# Patient Record
Sex: FEMALE | Race: BLACK OR AFRICAN AMERICAN | Employment: UNEMPLOYED | URBAN - METROPOLITAN AREA
[De-identification: names, ages, dates, MRNs, and addresses within clinical notes are randomized per-mention and may not be internally consistent; named-entity substitution may affect disease eponyms.]

---

## 2021-04-18 ENCOUNTER — HOSPITAL ENCOUNTER (EMERGENCY)
Age: 51
Discharge: HOME | End: 2021-04-19
Attending: EMERGENCY MEDICINE
Payer: MEDICAID

## 2021-04-18 ENCOUNTER — APPOINTMENT (OUTPATIENT)
Dept: GENERAL RADIOLOGY | Age: 51
End: 2021-04-18
Attending: EMERGENCY MEDICINE
Payer: MEDICAID

## 2021-04-18 ENCOUNTER — APPOINTMENT (OUTPATIENT)
Dept: CT IMAGING | Age: 51
End: 2021-04-18
Attending: EMERGENCY MEDICINE
Payer: MEDICAID

## 2021-04-18 DIAGNOSIS — D64.9 ANEMIA, UNSPECIFIED TYPE: Primary | ICD-10-CM

## 2021-04-18 DIAGNOSIS — R10.9 ABDOMINAL PAIN, UNSPECIFIED ABDOMINAL LOCATION: ICD-10-CM
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
## 2021-04-18 DIAGNOSIS — D21.9 FIBROID: ICD-10-CM

## 2021-04-18 DIAGNOSIS — S40.019A CONTUSION OF SHOULDER, UNSPECIFIED LATERALITY, INITIAL ENCOUNTER: ICD-10-CM

## 2021-04-18 DIAGNOSIS — S37.019A: ICD-10-CM

## 2021-04-18 LAB
ALBUMIN SERPL-MCNC: 3.4 G/DL (ref 3.4–5)
ALBUMIN/GLOB SERPL: 0.8 {RATIO} (ref 0.8–1.7)
ALP SERPL-CCNC: 139 U/L (ref 45–117)
ALT SERPL-CCNC: 25 U/L (ref 13–56)
ANION GAP SERPL CALC-SCNC: 7 MMOL/L (ref 3–18)
APPEARANCE UR: CLEAR
AST SERPL-CCNC: 26 U/L (ref 10–38)
BASOPHILS # BLD: 0.1 K/UL (ref 0–0.1)
BASOPHILS NFR BLD: 1 % (ref 0–2)
BILIRUB DIRECT SERPL-MCNC: 0.1 MG/DL (ref 0–0.2)
BILIRUB SERPL-MCNC: 0.2 MG/DL (ref 0.2–1)
BILIRUB UR QL: NEGATIVE
BUN SERPL-MCNC: 14 MG/DL (ref 7–18)
BUN/CREAT SERPL: 17 (ref 12–20)
CALCIUM SERPL-MCNC: 8.5 MG/DL (ref 8.5–10.1)
CHLORIDE SERPL-SCNC: 110 MMOL/L (ref 100–111)
CO2 SERPL-SCNC: 25 MMOL/L (ref 21–32)
COLOR UR: YELLOW
CREAT SERPL-MCNC: 0.83 MG/DL (ref 0.6–1.3)
DIFFERENTIAL METHOD BLD: ABNORMAL
EOSINOPHIL # BLD: 0.5 K/UL (ref 0–0.4)
EOSINOPHIL NFR BLD: 8 % (ref 0–5)
ERYTHROCYTE [DISTWIDTH] IN BLOOD BY AUTOMATED COUNT: 28.4 % (ref 11.6–14.5)
GLOBULIN SER CALC-MCNC: 4.4 G/DL (ref 2–4)
GLUCOSE SERPL-MCNC: 71 MG/DL (ref 74–99)
GLUCOSE UR STRIP.AUTO-MCNC: NEGATIVE MG/DL
HCT VFR BLD AUTO: 20.7 % (ref 35–45)
HEMOCCULT STL QL: NEGATIVE
HGB BLD-MCNC: 5.6 G/DL (ref 12–16)
HGB UR QL STRIP: NEGATIVE
KETONES UR QL STRIP.AUTO: NEGATIVE MG/DL
LEUKOCYTE ESTERASE UR QL STRIP.AUTO: NEGATIVE
LIPASE SERPL-CCNC: 94 U/L (ref 73–393)
LYMPHOCYTES # BLD: 1.8 K/UL (ref 0.9–3.6)
LYMPHOCYTES NFR BLD: 28 % (ref 21–52)
MCH RBC QN AUTO: 17.1 PG (ref 24–34)
MCHC RBC AUTO-ENTMCNC: 27.1 G/DL (ref 31–37)
MCV RBC AUTO: 63.3 FL (ref 74–97)
MONOCYTES # BLD: 0.6 K/UL (ref 0.05–1.2)
MONOCYTES NFR BLD: 9 % (ref 3–10)
NEUTS SEG # BLD: 3.6 K/UL (ref 1.8–8)
NEUTS SEG NFR BLD: 54 % (ref 40–73)
NITRITE UR QL STRIP.AUTO: NEGATIVE
PH UR STRIP: 6.5 [PH] (ref 5–8)
PLATELET # BLD AUTO: 277 K/UL (ref 135–420)
PLATELET COMMENTS,PCOM: ABNORMAL
PMV BLD AUTO: 9.1 FL (ref 9.2–11.8)
POTASSIUM SERPL-SCNC: 4 MMOL/L (ref 3.5–5.5)
PROT SERPL-MCNC: 7.8 G/DL (ref 6.4–8.2)
PROT UR STRIP-MCNC: NEGATIVE MG/DL
RBC # BLD AUTO: 3.27 M/UL (ref 4.2–5.3)
RBC MORPH BLD: ABNORMAL
SODIUM SERPL-SCNC: 142 MMOL/L (ref 136–145)
SP GR UR REFRACTOMETRY: >1.03 (ref 1–1.03)
TROPONIN I SERPL-MCNC: <0.02 NG/ML (ref 0–0.04)
UROBILINOGEN UR QL STRIP.AUTO: 1 EU/DL (ref 0.2–1)
WBC # BLD AUTO: 6.6 K/UL (ref 4.6–13.2)

## 2021-04-18 PROCEDURE — 80076 HEPATIC FUNCTION PANEL: CPT

## 2021-04-18 PROCEDURE — 85025 COMPLETE CBC W/AUTO DIFF WBC: CPT

## 2021-04-18 PROCEDURE — 86920 COMPATIBILITY TEST SPIN: CPT

## 2021-04-18 PROCEDURE — 73560 X-RAY EXAM OF KNEE 1 OR 2: CPT

## 2021-04-18 PROCEDURE — 71260 CT THORAX DX C+: CPT

## 2021-04-18 PROCEDURE — 73030 X-RAY EXAM OF SHOULDER: CPT

## 2021-04-18 PROCEDURE — 81003 URINALYSIS AUTO W/O SCOPE: CPT

## 2021-04-18 PROCEDURE — 80048 BASIC METABOLIC PNL TOTAL CA: CPT

## 2021-04-18 PROCEDURE — 74011000636 HC RX REV CODE- 636: Performed by: EMERGENCY MEDICINE

## 2021-04-18 PROCEDURE — 84484 ASSAY OF TROPONIN QUANT: CPT

## 2021-04-18 PROCEDURE — 99285 EMERGENCY DEPT VISIT HI MDM: CPT

## 2021-04-18 PROCEDURE — 83690 ASSAY OF LIPASE: CPT

## 2021-04-18 PROCEDURE — 86901 BLOOD TYPING SEROLOGIC RH(D): CPT

## 2021-04-18 PROCEDURE — 82270 OCCULT BLOOD FECES: CPT

## 2021-04-18 RX ORDER — SODIUM CHLORIDE 9 MG/ML
1000 INJECTION, SOLUTION INTRAVENOUS ONCE
Status: DISCONTINUED | OUTPATIENT
Start: 2021-04-18 | End: 2021-04-19 | Stop reason: HOSPADM

## 2021-04-18 RX ADMIN — IOPAMIDOL 100 ML: 612 INJECTION, SOLUTION INTRAVENOUS at 21:38

## 2021-04-18 NOTE — Clinical Note
[address] [place] [address] [facility] 
[address] [facility] [address] [phone] Work/School Note Date: 4/18/2021 To Whom It May concern: 
 
Merari Harrington was seen and treated today in the emergency room by the following provider(s): 
Attending Provider: Leslie López MD. Merari Harrington is excused from work/school on 4/19/2021 through 4/22/2021. She is medically clear to return to work/school on 4/23/2021.   
  
 
Sincerely, 
 
 
 
 
Emily Rogers MD

## 2021-04-19 VITALS
OXYGEN SATURATION: 100 % | WEIGHT: 165 LBS | BODY MASS INDEX: 31.15 KG/M2 | HEIGHT: 61 IN | TEMPERATURE: 98 F | SYSTOLIC BLOOD PRESSURE: 132 MMHG | DIASTOLIC BLOOD PRESSURE: 77 MMHG | RESPIRATION RATE: 18 BRPM | HEART RATE: 64 BPM

## 2021-04-19 LAB
HGB BLD-MCNC: 7.3 G/DL (ref 12–16)
HISTORY CHECKED?,CKHIST: NORMAL

## 2021-04-19 PROCEDURE — 85018 HEMOGLOBIN: CPT

## 2021-04-19 PROCEDURE — P9016 RBC LEUKOCYTES REDUCED: HCPCS

## 2021-04-19 PROCEDURE — P9040 RBC LEUKOREDUCED IRRADIATED: HCPCS

## 2021-04-19 PROCEDURE — 74011250636 HC RX REV CODE- 250/636: Performed by: EMERGENCY MEDICINE

## 2021-04-19 PROCEDURE — 36430 TRANSFUSION BLD/BLD COMPNT: CPT

## 2021-04-19 RX ORDER — ONDANSETRON 4 MG/1
4 TABLET, ORALLY DISINTEGRATING ORAL
Qty: 14 TAB | Refills: 0 | Status: SHIPPED | OUTPATIENT
Start: 2021-04-19

## 2021-04-19 RX ORDER — SODIUM CHLORIDE 9 MG/ML
250 INJECTION, SOLUTION INTRAVENOUS AS NEEDED
Status: DISCONTINUED | OUTPATIENT
Start: 2021-04-19 | End: 2021-04-19 | Stop reason: HOSPADM

## 2021-04-19 RX ORDER — TRAMADOL HYDROCHLORIDE 50 MG/1
50 TABLET ORAL
Qty: 12 TAB | Refills: 0 | Status: SHIPPED | OUTPATIENT
Start: 2021-04-19 | End: 2021-04-26

## 2021-04-19 RX ADMIN — SODIUM CHLORIDE 250 ML: 900 INJECTION, SOLUTION INTRAVENOUS at 04:47

## 2021-04-19 NOTE — ED NOTES
Pt. Reassessed and observed resting in bed asleep in the left lateral position of comfort with no new complaints. Pt is in NAD and has no reactions at this time to the blood products. MD notified and will continue to monitor.
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
## 2021-04-19 NOTE — ED NOTES
Pt. Reassessed at this time and is resting in bed in the POC. Pt is in NAD and has no new complaints at this time. MD notified and will continue to monitor.

## 2021-04-19 NOTE — ED NOTES
Pt. Reassessed at this time and is resting in bed in the POC, pt was able to void for a urine sample at this time without complications. Pt has no new complaints. MD notified and will continue to monitor.

## 2021-04-19 NOTE — DISCHARGE INSTRUCTIONS
Return for any bleeding, new or worsening pain, fever not resolving with motrin or tylenol, shortness of breath, vomiting, decreased fluid intake, weakness, numbness, dizziness, or any change or concerns.
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
## 2021-04-19 NOTE — ED NOTES
Pt. Reassessed and up to the bedside commode to void without any complications, pt back in bed in the POC with no new complaints, pt reports feeling better and is in NAD at this time. MD notified and will continue to monitor.

## 2021-04-20 LAB
ABO + RH BLD: NORMAL
BLD PROD TYP BPU: NORMAL
BLD PROD TYP BPU: NORMAL
BLOOD GROUP ANTIBODIES SERPL: NORMAL
BPU ID: NORMAL
BPU ID: NORMAL
CROSSMATCH RESULT,%XM: NORMAL
CROSSMATCH RESULT,%XM: NORMAL
SPECIMEN EXP DATE BLD: NORMAL
STATUS OF UNIT,%ST: NORMAL
STATUS OF UNIT,%ST: NORMAL
UNIT DIVISION, %UDIV: 0
UNIT DIVISION, %UDIV: 0